# Patient Record
Sex: FEMALE
[De-identification: names, ages, dates, MRNs, and addresses within clinical notes are randomized per-mention and may not be internally consistent; named-entity substitution may affect disease eponyms.]

---

## 2021-09-08 ENCOUNTER — DASHBOARD ENCOUNTERS (OUTPATIENT)
Age: 46
End: 2021-09-08

## 2021-09-15 ENCOUNTER — OFFICE VISIT (OUTPATIENT)
Dept: URBAN - METROPOLITAN AREA CLINIC 31 | Facility: CLINIC | Age: 46
End: 2021-09-15

## 2021-09-15 RX ORDER — ROSUVASTATIN CALCIUM 10 MG/1
1 TABLET TABLET, FILM COATED ORAL ONCE A DAY
Qty: 30 | Status: ACTIVE | COMMUNITY

## 2021-09-15 RX ORDER — BLOOD SUGAR DIAGNOSTIC
AS DIRECTED STRIP MISCELLANEOUS
Status: ACTIVE | COMMUNITY

## 2021-09-15 NOTE — HPI-MIGRATED HPI
;   ;     Abdominal Discomfort : 45 y/o female patient presents today with abdominal pain which is located on the ?  side. The onset was ---  ago.  The course / duration of symptoms is persistent/intermittent. The course/duration of symptoms is constant and fluctuating in intensification. The degree of symptoms is moderate to severe. The exacerbating factor is eating. The relieving factor is none.  Patient admits to ??? watery/semi- formed/formed bowel movements per day, with no mucus, melena, or blood in stool.  Patient admits/denies bloating/gas, indigestion, or changes in bowel habits. Patient admits/denies associated epigastric pain, nausea, vomiting, fever, chills, dizziness,  dysphagia, globus, sour eructations,  early satiety, changes in appetite, coughing.  Risk factors consist of not DM, not recent antibiotic use, not infectious exposure, no recent travel, not contaminated food and water, and not Crohn's disease.  Patient denies/admits past colonoscopy performed in --- by  ----- with ---- findings. Patient denies/admits any family history of colonic cancer/disease/polyps.  ;   Acid Reflux : 45 y/o female patient admits the continuous symptoms of longstanding history/recently onset of GERD/acid reflux. The symptoms include/are described as burning sensation and is located epigastric region. Patient admits/ denies that she has tried ??? medication with relief/no relief of symptoms. Patient admits/denies dysphagia, excessive belching, globus, sour eructations, bloating/gas, indigestion, early satiety, changes in appetite, coughing, abdominal/epigastric pain, or changes in bowel habits.  Patient states that they have had any recent changes in their medications.  Patient admits/denies family hx of gastric/esophageal cancer or diseases. Patient denies/admits past EGD which was performed in --- by  ---- with  -- findings.  ;